# Patient Record
Sex: MALE | Race: WHITE
[De-identification: names, ages, dates, MRNs, and addresses within clinical notes are randomized per-mention and may not be internally consistent; named-entity substitution may affect disease eponyms.]

---

## 2019-01-17 ENCOUNTER — HOSPITAL ENCOUNTER (EMERGENCY)
Dept: HOSPITAL 75 - ER | Age: 44
Discharge: HOME | End: 2019-01-17
Payer: COMMERCIAL

## 2019-01-17 VITALS — HEIGHT: 70 IN | BODY MASS INDEX: 27.2 KG/M2 | WEIGHT: 190 LBS

## 2019-01-17 VITALS — DIASTOLIC BLOOD PRESSURE: 96 MMHG | SYSTOLIC BLOOD PRESSURE: 156 MMHG

## 2019-01-17 VITALS — HEIGHT: 69 IN | BODY MASS INDEX: 28.14 KG/M2 | WEIGHT: 190 LBS

## 2019-01-17 VITALS — DIASTOLIC BLOOD PRESSURE: 93 MMHG | SYSTOLIC BLOOD PRESSURE: 140 MMHG

## 2019-01-17 DIAGNOSIS — S61.411A: Primary | ICD-10-CM

## 2019-01-17 DIAGNOSIS — Z23: ICD-10-CM
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
## 2019-01-17 PROCEDURE — 64450 NJX AA&/STRD OTHER PN/BRANCH: CPT

## 2019-01-17 PROCEDURE — 90715 TDAP VACCINE 7 YRS/> IM: CPT

## 2019-01-17 PROCEDURE — 12041 INTMD RPR N-HF/GENIT 2.5CM/<: CPT

## 2019-01-17 NOTE — ED UPPER EXTREMITY
General


Chief Complaint:  Laceration


Stated Complaint:  HAND LACERATION


Source:  patient


Exam Limitations:  no limitations





History of Present Illness


Date Seen by Provider:  Jan 17, 2019


Time Seen by Provider:  11:21


Initial Comments


43-year-old male who presents to the emergency room with complaints of a 

laceration to the right hand. He reports that approximately 30 minutes prior to 

arrival he was hammering a nail into his garage wall when his hand struck a 

piece of exposed metal on the wall. He has a 2 cm laceration to the dorsal 

surface of the right hand. He has full movement of the hand.


Onset:  just prior to arrival


Pain/Injury Location:  right hand


Method of Injury:  other (laceration)





Allergies and Home Medications


Allergies


Coded Allergies:  


     Penicillins (Verified  Allergy, Unknown, 1/17/19)





Patient Home Medication List


Home Medication List Reviewed:  Yes





Review of Systems


Constitutional:  no symptoms reported, see HPI


Skin:  see HPI, other (laceration to the top of the right hand.)





All Other Systems Reviewed


Negative Unless Noted:  Yes





Past Medical-Social-Family Hx


Past Med/Social Hx:  Reviewed Nursing Past Med/Soc Hx


Family Medical History


Reviewed Nursing Family Hx





Physical Exam


Vital Signs





Vital Signs - First Documented








 1/17/19





 11:13


 


Temp 98.9


 


Pulse 90


 


Resp 16


 


B/P (MAP) 167/120 (136)


 


Pulse Ox 99


 


O2 Delivery Room Air





Capillary Refill :


Height, Weight, BMI


Height: '"


Weight: lbs. oz. kg;  BMI


Method:


General Appearance:  WD/WN, no apparent distress


Cardiovascular:  normal peripheral pulses, regular rate, rhythm, no edema, no 

gallop, no JVD, no murmur


Respiratory:  chest non-tender, lungs clear, normal breath sounds, no 

respiratory distress, no accessory muscle use


Neurologic/Tendon:  normal sensation, normal motor functions, normal tendon 

functions, responds to pain, no evidence tendon injury, other (normal capillary 

refill and distal pulses, patient has full mobility of the hand.)


Skin:  normal color, warm/dry, other (2 cm laceration to the dorsal surface of 

the right hand.)


Lymphatic:  no adenopathy





Procedures/Interventions





   Wound Location:  Upper Extremities (right hand)


   Wound Length (cm):  2


   Wound's Depth, Shape:  superficial, linear


   Wound Explored:  clean


   Irrigated w/ Saline (ccs):  200


   Betadine Prep?:  Yes


   Anesthesia:  1% Lidocaine (3 mL's)


   Volume Anesthetic (ccs):  3


   Wound Debrided:  minimal


   Suture:  Prolene


   Suture Size:  4-0


   Number of Sutures:  3


Progress


The wound was anesthetized with approximately 3 mL's of lidocaine without 

epinephrine. The wound was cleaned and irrigated with approximately 200 mL's of 

normal saline and Betadine. The wound was closed with 3 simple interrupted 

sutures of 4-0 Prolene. Patient tolerated procedure well.





Progress/Results/Core Measures


Results/Orders


My Orders





Orders - DINORAH PISANO


Dipht,Pertuss(Acell),Tet Adult (Boostrix (1/17/19 11:30)


Lidocaine 1% Inj 20 Ml (Xylocaine 1% Inj (1/17/19 11:30)





Medications Given in ED





Current Medications








 Medications  Dose


 Ordered  Sig/Leti


 Route  Start Time


 Stop Time Status Last Admin


Dose Admin


 


 Diphtheria/


 Tetanus/Acell


 Pertussis  0.5 ml  ONCE ONCE


 IM  1/17/19 11:30


 1/17/19 11:31 DC 1/17/19 11:42


0.5 ML


 


 Lidocaine HCl  20 ml  ONCE  ONCE


 INJ  1/17/19 11:30


 1/17/19 11:31 DC 1/17/19 11:35


20 ML








Vital Signs/I&O











 1/17/19 1/17/19





 11:13 11:56


 


Temp 98.9 98.9


 


Pulse 90 79


 


Resp 16 16


 


B/P (MAP) 167/120 (136) 140/93 (109)


 


Pulse Ox 99 99


 


O2 Delivery Room Air Room Air











Departure


Impression





 Primary Impression:  


 Laceration


Disposition:  01 HOME, SELF-CARE


Condition:  Stable/Unchanged





Departure-Patient Inst.


Decision time for Depature:  11:24


Patient Instructions:  Laceration Repair With Stitches (DC)





Add. Discharge Instructions:  


Watch for signs of infection such as increased redness, swelling, drainage, 

pain. Return back to the emergency room for suture removal in 7 days. Follow-up 

with your primary care provider as needed. Return back to the emergency room 

for worsening symptoms or concerns as needed. All discharge instructions 

reviewed with patient and/or family. Voiced understanding.





Images


Extremities-Upper











1 - Laceration














DINORAH PISANO Jan 17, 2019 11:23